# Patient Record
(demographics unavailable — no encounter records)

---

## 2025-05-01 NOTE — ASSESSMENT
[FreeTextEntry1] : 1. pressure sensation to r ear likely d/t TMJ -audio exam: hearing WNL AU, type A tymps AU -results reviewed with patient -advised motrin, soft foods, protection from cold weather -f/u with dentist to be fitted for mouthguard

## 2025-05-01 NOTE — HISTORY OF PRESENT ILLNESS
[de-identified] : 27 y/o F p/w otalgia r for the past x4 months. Reports pressure sensation to R ear that would come and go, worsening when exposed to the cold. Had recent dental work (dental caries filling this past week), anesthetic injection did not aggravate the ear. Recent airplane travel in March - no issues with equalizing ear pressure. Denies hearing loss, tinnitus, h/o pe tube placement, h/o childhood ear infections.